# Patient Record
Sex: FEMALE | Race: WHITE | Employment: PART TIME | URBAN - METROPOLITAN AREA
[De-identification: names, ages, dates, MRNs, and addresses within clinical notes are randomized per-mention and may not be internally consistent; named-entity substitution may affect disease eponyms.]

---

## 2021-10-28 ENCOUNTER — HOSPITAL ENCOUNTER (OUTPATIENT)
Dept: LAB | Age: 66
Discharge: HOME | End: 2021-10-28
Payer: MEDICARE

## 2021-10-28 ENCOUNTER — TRANSCRIBE ORDER (OUTPATIENT)
Dept: REGISTRATION | Age: 66
End: 2021-10-28

## 2021-10-28 ENCOUNTER — HOSPITAL ENCOUNTER (OUTPATIENT)
Dept: NON INVASIVE DIAGNOSTICS | Age: 66
Discharge: HOME | End: 2021-10-28
Payer: MEDICARE

## 2021-10-28 DIAGNOSIS — J34.2 DEVIATED NASAL SEPTUM: ICD-10-CM

## 2021-10-28 DIAGNOSIS — J34.2 DEVIATED NASAL SEPTUM: Primary | ICD-10-CM

## 2021-10-28 LAB
ATRIAL RATE: 63 BPM
CALCULATED P AXIS, ECG09: 82 DEGREES
CALCULATED R AXIS, ECG10: 106 DEGREES
CALCULATED T AXIS, ECG11: 70 DEGREES
DIAGNOSIS, 93000: NORMAL
HCT VFR BLD AUTO: 40.9 % (ref 35–45)
HGB BLD-MCNC: 13 G/DL (ref 12–16)
P-R INTERVAL, ECG05: 148 MS
POTASSIUM SERPL-SCNC: 4.6 MMOL/L (ref 3.5–5.5)
Q-T INTERVAL, ECG07: 446 MS
QRS DURATION, ECG06: 130 MS
QTC CALCULATION (BEZET), ECG08: 456 MS
VENTRICULAR RATE, ECG03: 63 BPM

## 2021-10-28 PROCEDURE — 36415 COLL VENOUS BLD VENIPUNCTURE: CPT

## 2021-10-28 PROCEDURE — 93005 ELECTROCARDIOGRAM TRACING: CPT

## 2021-10-28 PROCEDURE — 84132 ASSAY OF SERUM POTASSIUM: CPT

## 2021-10-28 PROCEDURE — 85014 HEMATOCRIT: CPT

## 2021-11-01 ENCOUNTER — HOSPITAL ENCOUNTER (OUTPATIENT)
Dept: LAB | Age: 66
Discharge: HOME | End: 2021-11-01
Payer: MEDICARE

## 2021-11-01 PROCEDURE — 88304 TISSUE EXAM BY PATHOLOGIST: CPT

## 2021-11-01 PROCEDURE — 88311 DECALCIFY TISSUE: CPT

## 2023-02-13 ENCOUNTER — TELEPHONE (OUTPATIENT)
Facility: HOSPITAL | Age: 68
End: 2023-02-13

## 2023-02-24 ENCOUNTER — HOSPITAL ENCOUNTER (OUTPATIENT)
Facility: HOSPITAL | Age: 68
Setting detail: RECURRING SERIES
Discharge: HOME | End: 2023-02-27
Payer: MEDICARE

## 2023-02-24 PROCEDURE — 98960 EDU&TRN PT SELF-MGMT NQHP 1: CPT

## 2023-02-24 PROCEDURE — 97162 PT EVAL MOD COMPLEX 30 MIN: CPT

## 2023-02-24 PROCEDURE — 97110 THERAPEUTIC EXERCISES: CPT

## 2023-02-24 NOTE — PROGRESS NOTES
[facility]  [address] 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 Assessment/ key information:  Patient is a 69year old female presenting to Physical Therapy with c/o stress  urinary incontinence which is limiting ability function at previous level. Patient has no pain complaints with palpation of the pelvic floor muscles. Patient presents with decreased strength, coordination, and endurance of the pelvic floor muscles and decreased strength of postural stabilizers. Patient's voiding interval is a little too often but she is on a diuretic. Patient presents with limitted understanding of bladder and bowel anatomy/function, dietary irritants, and urge suppression. I feel patient would benefit from skilled therapeutic intervention to optimize highest functional level possible.       Patient will continue to benefit from skilled PT services to modify and progress therapeutic interventions, address functional mobility deficits, address ROM deficits, address strength deficits, analyze and address soft tissue restrictions, analyze and cue movement patterns, analyze and modify body mechanics/ergonomics, and assess and modify postural abnormalities to attain remaining goals. Evaluation Complexity HistoryMEDIUM  Complexity : 1-2 comorbidities / personal factors will impact the outcome/ POC  ; Examination MEDIUM Complexity : 3 Standardized tests and measures addressin body structure, function, activity limitation and / or participation in recreation  ;Presentation MEDIUM Complexity : Evolving with changing characteristics  ; Clinical Decision Making MEDIUM Complexity : FOTO score of 26-74 FOTO score = an established functional score where 100 = no disability  Overall Complexity Rating: MEDIUM    Problem List: Decreased pelvic floor mm awareness, Decreased pelvic floor mm strength, and Use of accessory muscles  Treatment Plan may include any combination of the following: Therapeutic exercise, Urge suppression techniques, Neuromuscular re-education, Manual therapy, and Patient education      Patient / Family readiness to learn indicated by: asking questions, trying to perform skills, interest, return verbalization , and return demonstration   Persons(s) to be included in education: patient (P)  Barriers to Learning/Limitations: None  Measures taken if barriers to learning present: none  Patient Goal (s): to quit having leakage all the time  Patient Self Reported Health Status: excellent  Rehabilitation Potential: excellent  Short term goals: To be achieved in 6 treatments[de-identified]  Patient will demonstrate proper dietary/fluid habits and urge suppression strategies that promote bladder and bowel health to aid in management of urinary urgency and incontinence. Status at eval: Patient consuming insufficient water  and unaware of bladder fitness, dietary irritants and urge suppression strategies. Her goal is 77 ounces/day. Patient will report improvement in UI complaints evidence by a decrease in pad usage and/or amount of leakage by 50% to improve QOL.   Status at eval: Patient using 2 pads (pantiliners) per day, generally drop or two (amount of leakage). Patient will be able to maintain pelvic floor contraction for 8 seconds, 8 repetitions with no accessory substitution or breath holding. Status at eval: PFMC 5 seconds, 8 repetitions with gluteal substitutions and breath holding     Long term goals: To be achieved in 6 treatments[de-identified]  Patient will report bladder continence 75% of the time with cough/sneeze/laugh and walking to the toilet. Status at eval: patient stress and urgency incontinence 1-2 times  per day     Patient will demonstrate pelvic floor muscle contraction 4/5 and ability to maintain contraction for 10 seconds, 10 repetitions. Status at eval: PFM 4/5, 5 second hold, 8 repetitions, with gluteal  substitution     Patient will demonstrate improvement of current complaints evidenced by a 11 point  improvement in FOTO,  Status at Eval: 51     Patient will demonstrate independence with management tools and exercise program that are beneficial for current condition in order to feel comfortable with Pelvic floor PT D/C and not fear exacerbation of current condition or symptoms returning. Status at eval : patient fearful of return to exercise and unaware of what activities to avoid to avoid exacerbation of current condition        Frequency / Duration: Patient to be seen 1 times per week for 12 treatments    Patient/ Caregiver education and instruction: Diagnosis, prognosis, self care, activity modification, and exercises     [x]  Plan of care has been reviewed with PTA    Certification Period: 2/24/2023 to 5/25/2023    Demi King, PT 2/24/2023 3:19 PM    ________________________________________________________________________    I certify that the above Therapy Services are being furnished while the patient is under my care. I agree with the treatment plan and certify that this therapy is necessary.     Physician's Signature:____________________  Date:____________Time:____________                                      Sabino Rashad Galeano, REGI-C      Please sign and return to [facility]  [address] Yanira Moreau, [address]  Ph [phone]  Fx [phone]

## 2023-02-24 NOTE — PROGRESS NOTES
Physical Therapy Evaluation      Patient Name: Eran Cleary    Date: 2023    : 1955  Insurance: Payor: MEDICARE / Plan: MEDICARE PART A AND B / Product Type: *No Product type* /      Patient  verified yes     Visit #   Current / Total 1 12   Time   In / Out 1232 135   Pain   In / Out 0/10 0/10   Subjective Functional Status/Changes: Eval   Changes to:  Meds, Allergies, Med Hx, Sx Hx? If yes, update Summary List no       TREATMENT AREA =  No admission diagnoses are documented for this encounter.     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   Limitations to PLOF: now must wear liners  Mechanism of Injury: insidious onset - approximately 2018  Current symptoms/Complaints: leakage when standing and cough/sneeze/laugh  Previous Treatment/Compliance: no  PMHx/Surgical Hx: Tubal ligation , carpal tunnel right, deviated septum, upper and lower eye lids, HTN  Work Hx: retired - worked at [employer]; works part time as a Mental Health counselor  Living Situation: lives with  and cat  Pt Goals: to quit having leakage all the time  Barriers: []pain []financial [x]time []transportation []other  Motivation: very  Substance use: [x]Alcohol - wine []Tobacco []other:   Cognition: A & O x 3    Other:    Current urinary complaint  leaks with activity (stress induced)  leaks with urgency  stress incontinence  urge incontinence    Bladder complaint longevity:  3 - 5 years    Bladder symptom progression:  worsened    Frequency of UI: 1-2 times per day    Pad use:  pantiliners: 1- 2 per day    Pad wetness when changed:  drop or two    Daytime urinary frequency:  Every 2-3 hour(s) during the day on diuretic     Nocturia:  1x/ night    Patient has failed previous pelvic floor muscle training? [] Yes    [x] No    Bowel function:  Regular BM, 5-7 per week  Stool Type ([place]): 4  Toileting position/modifications: no    Fecal Incontinence present? no    Pain complaint:  none    Pain scale:  N/A    Pain description:  NA    Diet: similar to Myplate    Fluid intake:    Fluid  Amount per day   Water 54    (needs 66 ounces)   Caffeine 32 ounces of coffee (1/2 caff and 1/2 decaf)   Alcohol  10 ounces of wine                  Pelvic Floor Assessment  Patient was educated on pelvic floor anatomy, structure, and function and implications for current presentation of s/s. Patient consents to pelvic floor assessment. External trigger point/ muscle tenderness:    Superficial PFM tenderness/restriction   Right/center Left   Bulbocavernosus (bulbospongiosus) none none   Ischiocavernosus none none   Superficial Transverse Perineal none none   Perineal body none    Levator Ani none none              PFM Screen:    Skin Integrity:  [] Healthy [] Red  [x] Labia Atrophy [] Fragile    Sensation: [x] Intact [] Diminished:    Muscle Bulk: [x] Symmetrical  [] Well-developed [] Atrophied:  []L   []R   []B    Prolapse: [x] Cystocele: 1  [] Rectocele:  [] Uterine prolapse:     Ability to actively bulge: yes  Bulge with cough slight; bulge no with knack prior to cough    Pelvic floor manual exam: Performed via internal digital vaginal assessment  vaginal  Introitus restriction/TTP (reported as hands on a clock):    No pain or restrictions    Deep PFM Tenderness/Restriction:    Right/center Left   pubococcygeus non none   Ischiococcygeus none none   Iliococcygeus none none   Obturator Internus none none   coccyx none             Pelvic floor MMT    PERF (Performance/Endurance/Repetitions//Flicks): 4/4/1//7  gluteal substitution noted    Pelvic muscle release pattern  3 - good release          Therapeutic Procedures: Tx Min Billable or 1:1 Min (if diff from Tx Min) Procedure, Rationale, Specifics   25 0 Other [id number] (CPT):  moderate complexity to improve patient's ability to progress to PLOF and address remaining functional goals. Details if applicable:     23 23 [id number] Therapeutic Exercise (timed):  increase ROM, strength, coordination, balance, and proprioception to improve patient's ability to progress to PLOF and address remaining functional goals. (see flow sheet as applicable)     Details if applicable:     15 15 [id number] Self Care/Home Management (timed):  improve patient knowledge and understanding of KPFE, urge suppresion, bladder fitness, bladder irritants  to improve patient's ability to progress to PLOF and address remaining functional goals. (see flow sheet as applicable)     Details if applicable:            Details if applicable:            Details if applicable:       [facility] BC Totals Reminder: bill using total billable min of TIMED therapeutic procedures (example: do not include dry needle or estim unattended, both untimed codes, in totals to left)  8-22 min = 1 unit; 23-37 min = 2 units; 38-52 min = 3 units; 53-67 min = 4 units; 68-82 min = 5 units   Total Total     [x]  Patient Education billed concurrently with other procedures   [x] Review HEP    [] Progressed/Changed HEP, detail:    [] Other detail:        Pain Level (0-10 scale) post treatment: 0/10    ASSESSMENT/Changes in Function: Patient is a 69year old female presenting to Physical Therapy with c/o stress  urinary incontinence which is limiting ability function at previous level. Patient has no pain complaints with palpation of the pelvic floor muscles. Patient presents with decreased strength, coordination, and endurance of the pelvic floor muscles and decreased strength of postural stabilizers. Patient's voiding interval is a little too often but she is on a diuretic.   Patient presents with limitted understanding of bladder and bowel anatomy/function, dietary irritants, and urge suppression. I feel patient would benefit from skilled therapeutic intervention to optimize highest functional level possible. Patient will continue to benefit from skilled PT services to modify and progress therapeutic interventions, address functional mobility deficits, address ROM deficits, address strength deficits, analyze and address soft tissue restrictions, analyze and cue movement patterns, analyze and modify body mechanics/ergonomics, and assess and modify postural abnormalities to attain remaining goals. [x]  See Plan of Care  []  See progress note/recertification  []  See Discharge Summary         Progress towards goals / Updated goals:  Short term goals: To be achieved in 6 treatments[de-identified]  Patient will demonstrate proper dietary/fluid habits and urge suppression strategies that promote bladder and bowel health to aid in management of urinary urgency and incontinence. Status at eval: Patient consuming insufficient water  and unaware of bladder fitness, dietary irritants and urge suppression strategies. Her goal is 77 ounces/day. Patient will report improvement in UI complaints evidence by a decrease in pad usage and/or amount of leakage by 50% to improve QOL. Status at eval: Patient using 2 pads (pantiliners) per day, generally drop or two (amount of leakage). Patient will be able to maintain pelvic floor contraction for 8 seconds, 8 repetitions with no accessory substitution or breath holding. Status at eval: PFMC 5 seconds, 8 repetitions with gluteal substitutions and breath holding    Long term goals: To be achieved in 6 treatments[de-identified]  Patient will report bladder continence 75% of the time with cough/sneeze/laugh and walking to the toilet.    Status at eval: patient stress and urgency incontinence 1-2 times  per day    Patient will demonstrate pelvic floor muscle contraction 4/5 and ability to maintain contraction for 10 seconds, 10 repetitions. Status at eval: PFM 4/5, 5 second hold, 8 repetitions, with gluteal  substitution    Patient will demonstrate improvement of current complaints evidenced by a 11 point  improvement in FOTO,  Status at Eval: 51    Patient will demonstrate independence with management tools and exercise program that are beneficial for current condition in order to feel comfortable with Pelvic floor PT D/C and not fear exacerbation of current condition or symptoms returning.    Status at eval : patient fearful of return to exercise and unaware of what activities to avoid to avoid exacerbation of current condition    PLAN  []  Upgrade activities as tolerated     [x]  Continue plan of care  []  Update interventions per flow sheet       []  Discharge due to:_  []  Other:_      Deb Lisa, PT 2/24/2023  12:18 PM

## 2023-03-06 ENCOUNTER — HOSPITAL ENCOUNTER (OUTPATIENT)
Facility: HOSPITAL | Age: 68
Setting detail: RECURRING SERIES
Discharge: HOME | End: 2023-03-09
Payer: MEDICARE

## 2023-03-06 PROCEDURE — 97110 THERAPEUTIC EXERCISES: CPT

## 2023-03-06 PROCEDURE — 97530 THERAPEUTIC ACTIVITIES: CPT

## 2023-03-06 PROCEDURE — 97112 NEUROMUSCULAR REEDUCATION: CPT

## 2023-03-06 NOTE — PROGRESS NOTES
PHYSICAL / OCCUPATIONAL THERAPY - DAILY TREATMENT NOTE (updated )    Patient Name: Cary Matthews    Date: 3/6/2023    : 1955  Insurance: Payor: MEDICARE / Plan: MEDICARE PART A AND B / Product Type: *No Product type* /      Patient  verified Yes     Visit #   Current / Total 2 12   Time   In / Out 1232 1235   Pain   In / Out 0/10 0/10   Subjective Functional Status/Changes: Patient reports using urge suppression techniques and reports feeling less UI. Changes to:  Meds, Allergies, Med Hx, Sx Hx? If yes, update Summary List no       TREATMENT AREA =  Stress incontinence (female) (male) [N39.3]    OBJECTIVE          Therapeutic Procedures: Tx Min Billable or 1:1 Min (if diff from Tx Min) Procedure, Rationale, Specifics   25 25 [id number] Neuromuscular Re-Education (timed):  improve balance, coordination, kinesthetic sense, posture, core stability and proprioception to improve patient's ability to develop conscious control of individual muscles and awareness of position of extremities in order to progress to PLOF and address remaining functional goals. (see flow sheet as applicable)     Details if applicable:       8 8 [id number] Therapeutic Exercise (timed):  increase ROM, strength, coordination, balance, and proprioception to improve patient's ability to progress to PLOF and address remaining functional goals. (see flow sheet as applicable)     Details if applicable:     20  [id number] Therapeutic Activity (timed):  use of dynamic activities replicating functional movements to increase ROM, strength, coordination, balance, and proprioception in order to improve patient's ability to progress to PLOF and address remaining functional goals.   (see flow sheet as applicable)     Details if applicable:            Details if applicable:            Details if applicable:     53 [facility] Totals Reminder: bill using total billable min of TIMED therapeutic procedures (example: do not include dry needle or estim unattended, both untimed codes, in totals to left)  8-22 min = 1 unit; 23-37 min = 2 units; 38-52 min = 3 units; 53-67 min = 4 units; 68-82 min = 5 units   Total Total     [x]  Patient Education billed concurrently with other procedures   [x] Review HEP    [] Progressed/Changed HEP, detail:    [] Other detail:       Objective Information/Functional Measures/Assessment  Biofeedback expectations and implications were reviewed with patient prior to intervention,   Performed sEMG with perianal electrodes as follows:    Position, initial resting tone Work/Rest/Reps Comments   Supine   5/10/10     Ave Resting at 3.6   uV    Min resting at  0.3   uV    Ave Work at   14.3     uV    Max Work at  32.9 [employer] interval 10.75 uV   Supine   2/4/10   Ave Resting at 4.3   uV    Min resting at  0.1   [place] Work at   14.3     uV    Max Work at 29.1 [employer] interval 10.02 uV    Baseline prior to exercise: Ave resting= 0.5   uV;  Min resting= 0.2  uV; Max resting=  1.2 uV   Baseline prior to exercise: Ave resting= 5.1   uV;  Min resting=  1.1 uV; Max resting= 13.1  uV    Patient demonstrated understanding of today's instruction, education, and recommendations. Patient consented to surface EMG study. She demonstrates rapid motor recruitment, decreased coordination from work to rest (initially), decreased endurance. Patient was able to increase her awareness of the pelvic floor and relax from a work state better. Patient was instructed on seated pelvic floor exercises she can do while on the plane to [place] (and to help decrease risk of thrombosis. Patient is progressing appropriately towards goals.    Patient will continue to benefit from skilled PT / OT services to modify and progress therapeutic interventions, analyze and address functional mobility deficits, analyze and address ROM deficits, analyze and address strength deficits, analyze and address soft tissue restrictions, analyze and cue for proper movement patterns, and analyze and modify for postural abnormalities to address functional deficits and attain remaining goals. Progress toward goals / Updated goals:  []  See Progress Note/Recertification    Short term goals: To be achieved in 6 treatments[de-identified]  Patient will demonstrate proper dietary/fluid habits and urge suppression strategies that promote bladder and bowel health to aid in management of urinary urgency and incontinence. Status at eval: Patient consuming insufficient water  and unaware of bladder fitness, dietary irritants and urge suppression strategies. Her goal is 77 ounces/day. Current:  Patient reports drinking 68 ounces GOAL MET  3/6/2023     Patient will report improvement in UI complaints evidence by a decrease in pad usage and/or amount of leakage by 50% to improve QOL. Status at eval: Patient using 2 pads (pantiliners) per day, generally drop or two (amount of leakage). Patient will be able to maintain pelvic floor contraction for 8 seconds, 8 repetitions with no accessory substitution or breath holding. Status at eval: PFMC 5 seconds, 8 repetitions with gluteal substitutions and breath holding     Long term goals: To be achieved in 6 treatments[de-identified]  Patient will report bladder continence 75% of the time with cough/sneeze/laugh and walking to the toilet. Status at eval: patient stress and urgency incontinence 1-2 times  per day     Patient will demonstrate pelvic floor muscle contraction 4/5 and ability to maintain contraction for 10 seconds, 10 repetitions.    Status at eval: PFM 4/5, 5 second hold, 8 repetitions, with gluteal  substitution     Patient will demonstrate improvement of current complaints evidenced by a 11 point  improvement in FOTO,  Status at Eval: 51     Patient will demonstrate independence with management tools and exercise program that are beneficial for current condition in order to feel comfortable with Pelvic floor PT D/C and not fear exacerbation of current condition or symptoms returning.    Status at eval : patient fearful of return to exercise and unaware of what activities to avoid to avoid exacerbation of current condition          PLAN  Yes  Continue plan of care  []  Upgrade activities as tolerated  []  Discharge due to :  []  Other:    Ivon Yan, PT    3/6/2023    12:25 PM    Future Appointments   Date Time Provider Sofi Hernandez   [facility]2023 [facility]   3/13/2023  2:30 PM Alveda Landau, PT [facility]   3/27/2023 12:30 PM Ivon Yan, PT [facility]

## 2023-03-13 ENCOUNTER — TELEPHONE (OUTPATIENT)
Facility: HOSPITAL | Age: 68
End: 2023-03-13

## 2023-03-13 ENCOUNTER — HOSPITAL ENCOUNTER (OUTPATIENT)
Facility: HOSPITAL | Age: 68
Setting detail: RECURRING SERIES
Discharge: HOME | End: 2023-03-16
Payer: MEDICARE

## 2023-03-13 PROCEDURE — 97530 THERAPEUTIC ACTIVITIES: CPT

## 2023-03-13 PROCEDURE — 97112 NEUROMUSCULAR REEDUCATION: CPT

## 2023-03-13 NOTE — PROGRESS NOTES
PHYSICAL / OCCUPATIONAL THERAPY - DAILY TREATMENT NOTE (updated )    Patient Name: Melody Ramirez    Date: 3/13/2023    : 1955  Insurance: Payor: MEDICARE / Plan: MEDICARE PART A AND B / Product Type: *No Product type* /      Patient  verified Yes     Visit #   Current / Total 3 12   Time   In / Out 2:46 3:30   Pain   In / Out 0 0   Subjective Functional Status/Changes: Pt arrived 16 minutes late for session today. She reports the leaking has gotten better, but had 2 episodes of incontinence. 1 was where she was waiting to go to the bathroom, walking around when she leaked and the other one she was standing. Changes to:  Meds, Allergies, Med Hx, Sx Hx? If yes, update Summary List no       TREATMENT AREA =  Stress incontinence (female) (male) [N39.3]    OBJECTIVE    Therapeutic Procedures: Tx Min Billable or 1:1 Min (if diff from Tx Min) Procedure, Rationale, Specifics   14  [id number] Therapeutic Activity (timed):  use of dynamic activities replicating functional movements to increase ROM, strength, coordination, balance, and proprioception in order to improve patient's ability to progress to PLOF and address remaining functional goals. (see flow sheet as applicable)     Details if applicable:  sled push and step ups     30  30875 Neuromuscular Re-Education (timed):  improve balance, coordination, kinesthetic sense, posture, core stability and proprioception to improve patient's ability to develop conscious control of individual muscles and awareness of position of extremities in order to progress to PLOF and address remaining functional goals.  (see flow sheet as applicable)     Details if applicable:            Details if applicable:            Details if applicable:            Details if applicable:     40  [facility] Totals Reminder: bill using total billable min of TIMED therapeutic procedures (example: do not include dry needle or estim unattended, both untimed codes, in totals to left)  8-22 min = 1 unit; 23-37 min = 2 units; 38-52 min = 3 units; 53-67 min = 4 units; 68-82 min = 5 units   Total Total     TOTAL TREATMENT TIME:       44     [x]  Patient Education billed concurrently with other procedures   [x] Review HEP    [] Progressed/Changed HEP, detail:    [] Other detail:       Objective Information/Functional Measures/Assessment    Pt tolerated treatment well today. She was given standing band exercises to perform while in [facility]. She was challenged with functional strengthening. She demonstrated left gluteus medius weakness during sled pushes, was challenged therefore with squats with band and lateral band walks. Patient will continue to benefit from skilled PT / OT services to modify and progress therapeutic interventions, analyze and address functional mobility deficits, analyze and address ROM deficits, analyze and address strength deficits, analyze and address soft tissue restrictions, analyze and cue for proper movement patterns, analyze and modify for postural abnormalities, analyze and address imbalance/dizziness, and instruct in home and community integration to address functional deficits and attain remaining goals. Progress toward goals / Updated goals:  []  See Progress Note/Recertification    Short term goals: To be achieved in 6 treatments[de-identified]  Patient will demonstrate proper dietary/fluid habits and urge suppression strategies that promote bladder and bowel health to aid in management of urinary urgency and incontinence. Status at eval: Patient consuming insufficient water  and unaware of bladder fitness, dietary irritants and urge suppression strategies. Her goal is 77 ounces/day. Current:  Patient reports drinking 68 ounces GOAL MET  3/6/2023     Patient will report improvement in UI complaints evidence by a decrease in pad usage and/or amount of leakage by 50% to improve QOL. Status at eval: Patient using 2 pads (pantiliners) per day, generally drop or two (amount of leakage). Patient will be able to maintain pelvic floor contraction for 8 seconds, 8 repetitions with no accessory substitution or breath holding. Status at eval: PFMC 5 seconds, 8 repetitions with gluteal substitutions and breath holding     Long term goals: To be achieved in 6 treatments[de-identified]  Patient will report bladder continence 75% of the time with cough/sneeze/laugh and walking to the toilet. Status at eval: patient stress and urgency incontinence 1-2 times  per day     Patient will demonstrate pelvic floor muscle contraction 4/5 and ability to maintain contraction for 10 seconds, 10 repetitions. Status at eval: PFM 4/5, 5 second hold, 8 repetitions, with gluteal  substitution     Patient will demonstrate improvement of current complaints evidenced by a 11 point  improvement in FOTO,  Status at Eval: 51     Patient will demonstrate independence with management tools and exercise program that are beneficial for current condition in order to feel comfortable with Pelvic floor PT D/C and not fear exacerbation of current condition or symptoms returning.    Status at eval : patient fearful of return to exercise and unaware of what activities to avoid to avoid exacerbation of current condition    PLAN  Yes  Continue plan of care  []  Upgrade activities as tolerated  []  Discharge due to :  []  Other:    Bear Campos, PT    3/13/2023    1:21 PM    Future Appointments   Date Time Provider Sofi Hernandez   3/13/2023  2:30 PM Bear Campos, PT [facility]   3/27/2023 12:30 PM Papi Eng, PT [facility]

## 2023-03-27 ENCOUNTER — TELEPHONE (OUTPATIENT)
Facility: HOSPITAL | Age: 68
End: 2023-03-27

## 2023-03-27 ENCOUNTER — APPOINTMENT (OUTPATIENT)
Facility: HOSPITAL | Age: 68
End: 2023-03-27
Payer: MEDICARE

## 2023-04-03 ENCOUNTER — TELEPHONE (OUTPATIENT)
Facility: HOSPITAL | Age: 68
End: 2023-04-03

## 2023-04-17 ENCOUNTER — TELEPHONE (OUTPATIENT)
Facility: HOSPITAL | Age: 68
End: 2023-04-17

## 2023-04-17 NOTE — TELEPHONE ENCOUNTER
Patient was marked as a N/S due to missing appt at 1150a, therapist called but there was no answer. Patient showed up at 1228p assuming her appt was at 1230p, r/s appt for following week.

## 2023-04-20 ENCOUNTER — HOSPITAL ENCOUNTER (OUTPATIENT)
Facility: HOSPITAL | Age: 68
Setting detail: RECURRING SERIES
Discharge: HOME | End: 2023-04-23
Payer: MEDICARE

## 2023-04-20 PROCEDURE — 97110 THERAPEUTIC EXERCISES: CPT

## 2023-04-20 PROCEDURE — 97112 NEUROMUSCULAR REEDUCATION: CPT

## 2023-04-20 PROCEDURE — 97530 THERAPEUTIC ACTIVITIES: CPT

## 2023-05-05 ENCOUNTER — HOSPITAL ENCOUNTER (OUTPATIENT)
Facility: HOSPITAL | Age: 68
Setting detail: RECURRING SERIES
Discharge: HOME | End: 2023-05-08
Payer: MEDICARE

## 2023-05-05 PROCEDURE — 97112 NEUROMUSCULAR REEDUCATION: CPT

## 2023-05-05 PROCEDURE — 97110 THERAPEUTIC EXERCISES: CPT

## 2023-05-05 PROCEDURE — 97530 THERAPEUTIC ACTIVITIES: CPT

## 2023-05-18 ENCOUNTER — HOSPITAL ENCOUNTER (OUTPATIENT)
Facility: HOSPITAL | Age: 68
Setting detail: RECURRING SERIES
Discharge: HOME | End: 2023-05-21
Payer: MEDICARE

## 2023-05-18 PROCEDURE — 97110 THERAPEUTIC EXERCISES: CPT

## 2023-05-18 PROCEDURE — 97112 NEUROMUSCULAR REEDUCATION: CPT

## 2023-05-18 PROCEDURE — 97530 THERAPEUTIC ACTIVITIES: CPT

## 2023-05-24 ENCOUNTER — HOSPITAL ENCOUNTER (OUTPATIENT)
Facility: HOSPITAL | Age: 68
Setting detail: RECURRING SERIES
Discharge: HOME | End: 2023-05-27
Payer: MEDICARE

## 2023-05-24 PROCEDURE — 97530 THERAPEUTIC ACTIVITIES: CPT

## 2023-05-24 PROCEDURE — 97112 NEUROMUSCULAR REEDUCATION: CPT

## 2023-05-24 PROCEDURE — 97110 THERAPEUTIC EXERCISES: CPT

## 2023-05-24 NOTE — PROGRESS NOTES
PHYSICAL / OCCUPATIONAL THERAPY - DAILY TREATMENT NOTE (updated )    Patient Name: Yung Ribeiro    Date: 2023    : 1955  Insurance: Payor: MEDICARE / Plan: MEDICARE PART A AND B / Product Type: *No Product type* /      Patient  verified Yes     Visit #   Current / Total 7 12   Time   In / Out 9:53 AM 10:31 AM   Pain   In / Out 0 0   Subjective Functional Status/Changes: Patient reports no new complaints. Changes to:  Meds, Allergies, Med Hx, Sx Hx? If yes, update Summary List no       TREATMENT AREA =  Stress incontinence (female) (male) [N39.3]    OBJECTIVE    Therapeutic Procedures: Tx Min Billable or 1:1 Min (if diff from Tx Min) Procedure, Rationale, Specifics   10  [id number] Therapeutic Exercise (timed):  increase ROM, strength, coordination, balance, and proprioception to improve patient's ability to progress to PLOF and address remaining functional goals. (see flow sheet as applicable)     Details if applicable:       20  17694 Neuromuscular Re-Education (timed):  improve balance, coordination, kinesthetic sense, posture, core stability and proprioception to improve patient's ability to develop conscious control of individual muscles and awareness of position of extremities in order to progress to PLOF and address remaining functional goals. (see flow sheet as applicable)     Details if applicable:     8  [id number] Therapeutic Activity (timed):  use of dynamic activities replicating functional movements to increase ROM, strength, coordination, balance, and proprioception in order to improve patient's ability to progress to PLOF and address remaining functional goals.   (see flow sheet as applicable)     Details if applicable:    -discussed breath work with [employer] jumpboard  -reviewed the [employer]          Details if applicable:            Details if applicable:     45  [email or web address] Totals Reminder: bill using total billable min of TIMED therapeutic procedures (example: do not include dry needle or

## 2023-06-07 ENCOUNTER — HOSPITAL ENCOUNTER (OUTPATIENT)
Facility: HOSPITAL | Age: 68
Setting detail: RECURRING SERIES
Discharge: HOME | End: 2023-06-10
Payer: MEDICARE

## 2023-06-07 PROCEDURE — 97112 NEUROMUSCULAR REEDUCATION: CPT

## 2023-06-07 PROCEDURE — 97530 THERAPEUTIC ACTIVITIES: CPT

## 2023-06-07 PROCEDURE — 97110 THERAPEUTIC EXERCISES: CPT

## 2023-06-07 NOTE — PROGRESS NOTES
to improve QOL. Status at eval: Patient using 2 pads (pantiliners) per day, generally drop or two (amount of leakage). Current:  Patient reports using 1 pad  (pantiliner) per day. Progressing 4/20/2023  Current:  Patient reports using 1 pad (pantiliner) per day with some dry days. She reports 50% improvement. GOAL MET 5/18/2023     Patient will be able to maintain pelvic floor contraction for 8 seconds, 8 repetitions with no accessory substitution or breath holding. Status at eval: PFMC 5 seconds, 8 repetitions with gluteal substitutions and breath holding  Current:  4/10/8//6  GOAL MET 4/20/2023     Long term goals: To be achieved in 6 treatments[de-identified]  Patient will report bladder continence 75% of the time with cough/sneeze/laugh and walking to the toilet. Status at eval: patient stress and urgency incontinence 1-2 times  per day  Current:  Patient reports using 1 pad (pantiliner) per day with some dry days. She reports 50% improvement. Patient was able to perform jumping jacks to 17 sec. 5/18/2023 Progressing     Patient will demonstrate pelvic floor muscle contraction 4/5 and ability to maintain contraction for 10 seconds, 10 repetitions. Status at eval: PFM 4/5, 5 second hold, 8 repetitions, with gluteal  substitution  Current:  4/10/8//6 Progressing 4/20/2023  Current:  Pt defers. 5/18/2023     Patient will demonstrate improvement of current complaints evidenced by a 11 point  improvement in FOTO,  Status at Eval: 51  Current: Will assess at the 10th visit  5/18/2023     Patient will demonstrate independence with management tools and exercise program that are beneficial for current condition in order to feel comfortable with Pelvic floor PT D/C and not fear exacerbation of current condition or symptoms returning.    Status at eval : patient fearful of return to exercise and unaware of what activities to avoid to avoid exacerbation of current condition  Current: discussed difficulty of getting HEP in

## 2023-06-21 ENCOUNTER — APPOINTMENT (OUTPATIENT)
Facility: HOSPITAL | Age: 68
End: 2023-06-21
Payer: MEDICARE

## 2023-06-28 ENCOUNTER — APPOINTMENT (OUTPATIENT)
Facility: HOSPITAL | Age: 68
End: 2023-06-28
Payer: MEDICARE